# Patient Record
Sex: FEMALE | Race: WHITE | NOT HISPANIC OR LATINO | Employment: FULL TIME | URBAN - NONMETROPOLITAN AREA
[De-identification: names, ages, dates, MRNs, and addresses within clinical notes are randomized per-mention and may not be internally consistent; named-entity substitution may affect disease eponyms.]

---

## 2024-02-19 PROBLEM — R55 SYNCOPE: Status: ACTIVE | Noted: 2024-02-19

## 2024-02-19 PROBLEM — I00 RHEUMATIC FEVER: Status: ACTIVE | Noted: 2024-02-19

## 2024-02-19 PROBLEM — I06.1 RHEUMATIC AORTIC VALVE INSUFFICIENCY: Status: ACTIVE | Noted: 2024-02-19

## 2024-02-19 PROBLEM — I05.1 RHEUMATIC MITRAL REGURGITATION: Status: ACTIVE | Noted: 2024-02-19

## 2024-02-19 PROBLEM — C73 THYROID CANCER (MULTI): Status: ACTIVE | Noted: 2024-02-19

## 2024-02-19 RX ORDER — LEVOTHYROXINE SODIUM 125 UG/1
125 TABLET ORAL
COMMUNITY
End: 2024-05-24 | Stop reason: ALTCHOICE

## 2024-05-24 ENCOUNTER — OFFICE VISIT (OUTPATIENT)
Dept: CARDIOLOGY | Facility: CLINIC | Age: 34
End: 2024-05-24
Payer: COMMERCIAL

## 2024-05-24 VITALS
HEIGHT: 62 IN | SYSTOLIC BLOOD PRESSURE: 102 MMHG | DIASTOLIC BLOOD PRESSURE: 72 MMHG | BODY MASS INDEX: 27.42 KG/M2 | WEIGHT: 149 LBS | HEART RATE: 80 BPM

## 2024-05-24 DIAGNOSIS — I05.1 RHEUMATIC MITRAL REGURGITATION: Primary | ICD-10-CM

## 2024-05-24 DIAGNOSIS — I00 RHEUMATIC FEVER: ICD-10-CM

## 2024-05-24 DIAGNOSIS — I06.1 RHEUMATIC AORTIC VALVE INSUFFICIENCY: ICD-10-CM

## 2024-05-24 DIAGNOSIS — Z78.9 NEVER SMOKED TOBACCO: ICD-10-CM

## 2024-05-24 PROCEDURE — 1036F TOBACCO NON-USER: CPT | Performed by: INTERNAL MEDICINE

## 2024-05-24 PROCEDURE — 99213 OFFICE O/P EST LOW 20 MIN: CPT | Performed by: INTERNAL MEDICINE

## 2024-05-24 PROCEDURE — 3008F BODY MASS INDEX DOCD: CPT | Performed by: INTERNAL MEDICINE

## 2024-05-24 NOTE — LETTER
May 24, 2024     Ashley Henley DO  [address]  [place] [address]    Patient: Anni Andino   YOB: 1990   Date of Visit: 5/24/2024       Dear Dr. Ashley Henley DO:    Thank you for referring Anni Andino to me for evaluation. Below are my notes for this consultation.  If you have questions, please do not hesitate to call me. I look forward to following your patient along with you.       Sincerely,     Marla Shafer MD      CC: No Recipients  ______________________________________________________________________________________    Subjective   Anni Andino is a 34 y.o. female       Chief Complaint    Follow-up          HPI       Patient is here for follow-up continue management for history of rheumatic fever affecting both the mitral and aortic valve with mild to moderate regurgitation.  Since last time I saw her she denies any change in cardiac status or symptoms.  She remains active.  Her recent echocardiogram showed no change in her finding.  She reports she has a fourth child recently without any cardiac issues or complication.    ASSESSMENT:      1. History of rheumatic fever affecting the mitral valve with mild-to-moderate aortic regurgitation and mitral regurgitation. Stable unchanged.  Her recent echo showed no change  2. Syncope couple of years ago with no recurrence. Cardiac work-up appears to be benign I suspect vasovagal.  She had no recurrence  3. Minimal obesity. With no significant weight changes  4. History of thyroid cancer followed by [facility]. No recurrence  5.  Patient reports she had a fourth child and she is homeschooling     RECOMMENDATION:      1. I advised her to continue with observant approach  2. We will see her back in the office in in 1 to 2 years  3. Endocarditis prophylaxis and prophylaxis for her rheumatic fever reviewed with the patient at length.  4. Patient was counseled regarding losing weight, exercise  "and dietary modification  5. I reviewed her recent echo with her      Review of Systems   All other systems reviewed and are negative.           Vitals:    05/24/24 0838   BP: 102/72   BP Location: Left arm   Patient Position: Sitting   Pulse: 80   Weight: 67.6 kg (149 lb)   Height: 1.575 m (5' 2\")        Objective   Physical Exam  Constitutional:       Appearance: Normal appearance.   HENT:      Nose: Nose normal.   Neck:      Vascular: No carotid bruit.   Cardiovascular:      Rate and Rhythm: Normal rate.      Pulses: Normal pulses.      Heart sounds: Normal heart sounds.   Pulmonary:      Effort: Pulmonary effort is normal.   Abdominal:      General: Bowel sounds are normal.      Palpations: Abdomen is soft.   Musculoskeletal:         General: Normal range of motion.      Cervical back: Normal range of motion.      Right lower leg: No edema.      Left lower leg: No edema.   Skin:     General: Skin is warm and dry.   Neurological:      General: No focal deficit present.      Mental Status: She is alert.   Psychiatric:         Mood and Affect: Mood normal.         Behavior: Behavior normal.         Thought Content: Thought content normal.         Judgment: Judgment normal.         Allergies  Prochlorperazine     Current Medications    Current Outpatient Medications:   •  PRENATAL 2-IRON-FOLIC ACID-OM3 ORAL, Take 1 tablet by mouth once daily., Disp: , Rfl:   •  TYROSINE ORAL, Take 200 mcg by mouth once daily., Disp: , Rfl:                      Assessment/Plan   1. Rheumatic mitral regurgitation        2. Rheumatic aortic valve insufficiency        3. Rheumatic fever        4. BMI 27.0-27.9,adult        5. Never smoked tobacco                 Scribe Attestation  By signing my name below, Kristyn LANCASTER LPN, Scribe   attest that this documentation has been prepared under the direction and in the presence of Marla Shafer MD.     Provider Attestation - Scribe documentation    All medical record entries made by the " Scribe were at my direction and personally dictated by me. I have reviewed the chart and agree that the record accurately reflects my personal performance of the history, physical exam, discussion and plan.

## 2024-05-24 NOTE — PROGRESS NOTES
"Cedrick Andino is a 34 y.o. female       Chief Complaint    Follow-up          HPI       Patient is here for follow-up continue management for history of rheumatic fever affecting both the mitral and aortic valve with mild to moderate regurgitation.  Since last time I saw her she denies any change in cardiac status or symptoms.  She remains active.  Her recent echocardiogram showed no change in her finding.  She reports she has a fourth child recently without any cardiac issues or complication.    ASSESSMENT:      1. History of rheumatic fever affecting the mitral valve with mild-to-moderate aortic regurgitation and mitral regurgitation. Stable unchanged.  Her recent echo showed no change  2. Syncope couple of years ago with no recurrence. Cardiac work-up appears to be benign I suspect vasovagal.  She had no recurrence  3. Minimal obesity. With no significant weight changes  4. History of thyroid cancer followed by [facility]. No recurrence  5.  Patient reports she had a fourth child and she is homeschooling     RECOMMENDATION:      1. I advised her to continue with observant approach  2. We will see her back in the office in in 1 to 2 years  3. Endocarditis prophylaxis and prophylaxis for her rheumatic fever reviewed with the patient at length.  4. Patient was counseled regarding losing weight, exercise and dietary modification  5. I reviewed her recent echo with her      Review of Systems   All other systems reviewed and are negative.           Vitals:    05/24/24 0838   BP: 102/72   BP Location: Left arm   Patient Position: Sitting   Pulse: 80   Weight: 67.6 kg (149 lb)   Height: 1.575 m (5' 2\")        Objective   Physical Exam  Constitutional:       Appearance: Normal appearance.   HENT:      Nose: Nose normal.   Neck:      Vascular: No carotid bruit.   Cardiovascular:      Rate and Rhythm: Normal rate.      Pulses: Normal pulses.      Heart sounds: Normal heart sounds.   Pulmonary:      " Effort: Pulmonary effort is normal.   Abdominal:      General: Bowel sounds are normal.      Palpations: Abdomen is soft.   Musculoskeletal:         General: Normal range of motion.      Cervical back: Normal range of motion.      Right lower leg: No edema.      Left lower leg: No edema.   Skin:     General: Skin is warm and dry.   Neurological:      General: No focal deficit present.      Mental Status: She is alert.   Psychiatric:         Mood and Affect: Mood normal.         Behavior: Behavior normal.         Thought Content: Thought content normal.         Judgment: Judgment normal.         Allergies  Prochlorperazine     Current Medications    Current Outpatient Medications:     PRENATAL 2-IRON-FOLIC ACID-OM3 ORAL, Take 1 tablet by mouth once daily., Disp: , Rfl:     TYROSINE ORAL, Take 200 mcg by mouth once daily., Disp: , Rfl:                      Assessment/Plan   1. Rheumatic mitral regurgitation        2. Rheumatic aortic valve insufficiency        3. Rheumatic fever        4. BMI 27.0-27.9,adult        5. Never smoked tobacco                 Scribe Attestation  By signing my name below, Kristyn LANCASTER LPN, Scribe   attest that this documentation has been prepared under the direction and in the presence of Marla Shafer MD.     Provider Attestation - Scribe documentation    All medical record entries made by the Scribe were at my direction and personally dictated by me. I have reviewed the chart and agree that the record accurately reflects my personal performance of the history, physical exam, discussion and plan.

## 2024-05-24 NOTE — PATIENT INSTRUCTIONS
Please bring all medicines, vitamins, and herbal supplements with you when you come to the office.    Prescriptions will not be filled unless you are compliant with your follow up appointments or have a follow up appointment scheduled as per instruction of your physician. Refills should be requested at the time of your visit.     BMI was above normal measurement. Current weight: 67.6 kg (149 lb)  Weight change since last visit (-) denotes wt loss -1.5 lbs   Weight loss needed to achieve BMI 25: 12.6 Lbs  Weight loss needed to achieve BMI 30: -14.7 Lbs  Provided instructions on dietary changes.    Two year

## 2025-07-24 ENCOUNTER — APPOINTMENT (OUTPATIENT)
Dept: CARDIOLOGY | Facility: CLINIC | Age: 35
End: 2025-07-24
Payer: COMMERCIAL

## 2025-08-18 ENCOUNTER — APPOINTMENT (OUTPATIENT)
Dept: CARDIOLOGY | Facility: CLINIC | Age: 35
End: 2025-08-18
Payer: COMMERCIAL

## 2025-08-18 VITALS
DIASTOLIC BLOOD PRESSURE: 68 MMHG | WEIGHT: 149.2 LBS | HEART RATE: 66 BPM | SYSTOLIC BLOOD PRESSURE: 104 MMHG | BODY MASS INDEX: 27.46 KG/M2 | HEIGHT: 62 IN

## 2025-08-18 DIAGNOSIS — I05.1 RHEUMATIC MITRAL REGURGITATION: ICD-10-CM

## 2025-08-18 DIAGNOSIS — I00 RHEUMATIC FEVER: ICD-10-CM

## 2025-08-18 DIAGNOSIS — R55 SYNCOPE, UNSPECIFIED SYNCOPE TYPE: ICD-10-CM

## 2025-08-18 DIAGNOSIS — I06.1 RHEUMATIC AORTIC VALVE INSUFFICIENCY: Primary | ICD-10-CM

## 2025-08-18 DIAGNOSIS — Z78.9 NEVER SMOKED TOBACCO: ICD-10-CM

## 2025-08-18 DIAGNOSIS — C73 THYROID CANCER (MULTI): ICD-10-CM

## 2025-08-18 PROCEDURE — 3008F BODY MASS INDEX DOCD: CPT | Performed by: INTERNAL MEDICINE

## 2025-08-18 PROCEDURE — 99213 OFFICE O/P EST LOW 20 MIN: CPT | Performed by: INTERNAL MEDICINE

## 2025-08-18 PROCEDURE — 1036F TOBACCO NON-USER: CPT | Performed by: INTERNAL MEDICINE

## 2026-08-18 ENCOUNTER — APPOINTMENT (OUTPATIENT)
Dept: CARDIOLOGY | Facility: CLINIC | Age: 36
End: 2026-08-18
Payer: COMMERCIAL